# Patient Record
Sex: MALE | URBAN - METROPOLITAN AREA
[De-identification: names, ages, dates, MRNs, and addresses within clinical notes are randomized per-mention and may not be internally consistent; named-entity substitution may affect disease eponyms.]

---

## 2022-08-19 ENCOUNTER — OFFICE VISIT (OUTPATIENT)
Dept: URBAN - METROPOLITAN AREA CLINIC 63 | Facility: CLINIC | Age: 52
End: 2022-08-19
Payer: MEDICARE

## 2022-08-19 ENCOUNTER — DASHBOARD ENCOUNTERS (OUTPATIENT)
Age: 52
End: 2022-08-19

## 2022-08-19 ENCOUNTER — WEB ENCOUNTER (OUTPATIENT)
Dept: URBAN - METROPOLITAN AREA CLINIC 63 | Facility: CLINIC | Age: 52
End: 2022-08-19

## 2022-08-19 VITALS
SYSTOLIC BLOOD PRESSURE: 126 MMHG | HEART RATE: 70 BPM | HEIGHT: 70 IN | OXYGEN SATURATION: 99 % | BODY MASS INDEX: 29.29 KG/M2 | WEIGHT: 204.6 LBS | DIASTOLIC BLOOD PRESSURE: 80 MMHG | TEMPERATURE: 98.3 F

## 2022-08-19 DIAGNOSIS — K63.5 COLON POLYP: ICD-10-CM

## 2022-08-19 DIAGNOSIS — Z12.11 SCREEN FOR COLON CANCER: ICD-10-CM

## 2022-08-19 DIAGNOSIS — K29.70 HELICOBACTER POSITIVE GASTRITIS: ICD-10-CM

## 2022-08-19 PROBLEM — 196731005 GASTRODUODENITIS: Status: ACTIVE | Noted: 2022-08-19

## 2022-08-19 PROCEDURE — 99203 OFFICE O/P NEW LOW 30 MIN: CPT | Performed by: INTERNAL MEDICINE

## 2022-08-19 RX ORDER — IBUPROFEN 600 MG/1
1 TABLET WITH FOOD OR MILK AS NEEDED TABLET, FILM COATED ORAL THREE TIMES A DAY
Status: ACTIVE | COMMUNITY

## 2022-08-19 RX ORDER — LISINOPRIL 5 MG/1
1 TABLET TABLET ORAL ONCE A DAY
Status: ACTIVE | COMMUNITY

## 2022-08-19 RX ORDER — POLYETHYLENE GLYCOL 3350, SODIUM SULFATE ANHYDROUS, SODIUM BICARBONATE, SODIUM CHLORIDE, POTASSIUM CHLORIDE 236; 22.74; 6.74; 5.86; 2.97 G/4L; G/4L; G/4L; G/4L; G/4L
AS DIRECTED POWDER, FOR SOLUTION ORAL
Qty: 4000 MILLILITER | Refills: 0 | OUTPATIENT
Start: 2022-08-19 | End: 2022-08-20

## 2022-08-19 NOTE — HPI-PREVIOUS PROCEDURES
Upper endoscopy and colonoscopy done in [place] 4 years ago No reports available.  Apparently had 3 polyps removed on his colonoscopy.  Reports that he did not have H. pylori (gastric biopsies.

## 2022-08-19 NOTE — HPI-TODAY'S VISIT:
Hugo is a pleasant 52-year-old  male who is here requesting a surveillance colonoscopy.  He has a history of colon polyps.  Apparently had a colonoscopy done in [place] approximately 4 years ago and had 3 polyps removed. He had an upper endoscopy at the time that noted no H. pylori per his report I do not have any records to review.  Apparently has a remote history of H. pylori which she contracted in [place].  He was treated. His second cousin was diagnosed with gastric cancer. He reports no symptoms of reflux or abdominal pain or bloating or nausea.  Denies any early satiety, dysphagia etc. he has lactose intolerance and reports bloating and flatulence and diarrhea if he consumes dairy. Denies any symptoms of constipation rectal bleeding or melena. He uses ibuprofen for headaches approximately 3-4 times a week.  Denies any history of ulcers. No unintentional weight loss or loss of appetite.

## 2022-08-26 ENCOUNTER — LAB OUTSIDE AN ENCOUNTER (OUTPATIENT)
Dept: URBAN - METROPOLITAN AREA CLINIC 63 | Facility: CLINIC | Age: 52
End: 2022-08-26

## 2022-10-05 LAB — HELICOBACTER PYLORI AG, EIA, STOOL: (no result)

## 2022-11-14 ENCOUNTER — OFFICE VISIT (OUTPATIENT)
Dept: URBAN - METROPOLITAN AREA SURGERY CENTER 4 | Facility: SURGERY CENTER | Age: 52
End: 2022-11-14